# Patient Record
Sex: FEMALE | Race: BLACK OR AFRICAN AMERICAN | NOT HISPANIC OR LATINO
[De-identification: names, ages, dates, MRNs, and addresses within clinical notes are randomized per-mention and may not be internally consistent; named-entity substitution may affect disease eponyms.]

---

## 2019-09-04 ENCOUNTER — APPOINTMENT (OUTPATIENT)
Dept: OBGYN | Facility: CLINIC | Age: 64
End: 2019-09-04
Payer: COMMERCIAL

## 2019-09-04 VITALS
SYSTOLIC BLOOD PRESSURE: 140 MMHG | WEIGHT: 125 LBS | DIASTOLIC BLOOD PRESSURE: 90 MMHG | HEIGHT: 63 IN | BODY MASS INDEX: 22.15 KG/M2

## 2019-09-04 PROCEDURE — 82270 OCCULT BLOOD FECES: CPT

## 2019-09-04 PROCEDURE — 99396 PREV VISIT EST AGE 40-64: CPT

## 2019-09-04 NOTE — PHYSICAL EXAM
[Awake] : awake [Alert] : alert [Acute Distress] : no acute distress [Mass] : no breast mass [Nipple Discharge] : no nipple discharge [Axillary LAD] : no axillary lymphadenopathy [Soft] : soft [Distended] : not distended [Tender] : non tender [H/Smegaly] : no hepatosplenomegaly [Oriented x3] : oriented to person, place, and time [Labia Majora] : labia major [Labia Minora] : labia minora [Normal] : clitoris [No Bleeding] : there was no active vaginal bleeding [Discharge] : had no discharge [Pap Obtained] : a Pap smear was performed [Dilated] : the cervix was not dilated [Motion Tenderness] : there was no cervical motion tenderness [Retroversion] : retroverted [Tenderness] : nontender [Enlarged ___ wks] : not enlarged [Uterine Adnexae] : were not tender and not enlarged [Mass ___ cm] : no uterine mass was palpated [Adnexa Tenderness] : were not tender [Ovarian Mass (___ Cm)] : there were no adnexal masses [Occult Blood] : occult blood test from digital rectal exam was negative [No Tenderness] : no rectal tenderness [Nl Sphincter Tone] : normal sphincter tone [Internal Hemorrhoid] : no internal hemorrhoids [External Hemorrhoid] : no external hemorrhoids [FreeTextEntry5] : STENOTIC EXTERNAL OS

## 2019-09-04 NOTE — HISTORY OF PRESENT ILLNESS
[1 Year Ago] : 1 year ago [Last Colonoscopy ___] : Last colonoscopy [unfilled] [Last Mammogram ___] : Last Mammogram was [unfilled] [Last Pap ___] : Last cervical pap smear was [unfilled] [Postmenopausal] : is postmenopausal [Pregnancy History] : pregnancy history: [Total Preg ___] : [unfilled] [Post-Menopause, No Sxs] : post-menopausal, currently without symptoms [Sexually Active] : is not sexually active

## 2019-09-09 LAB — CYTOLOGY CVX/VAG DOC THIN PREP: ABNORMAL

## 2020-02-07 ENCOUNTER — APPOINTMENT (OUTPATIENT)
Dept: OBGYN | Facility: CLINIC | Age: 65
End: 2020-02-07
Payer: COMMERCIAL

## 2020-02-07 VITALS
DIASTOLIC BLOOD PRESSURE: 82 MMHG | HEIGHT: 63 IN | SYSTOLIC BLOOD PRESSURE: 140 MMHG | BODY MASS INDEX: 22.15 KG/M2 | WEIGHT: 125 LBS

## 2020-02-07 PROCEDURE — 99213 OFFICE O/P EST LOW 20 MIN: CPT

## 2020-02-07 NOTE — CHIEF COMPLAINT
[Follow Up] : follow up GYN visit [FreeTextEntry1] : PATIENT PRESENT FOR GYN FOLLOW UP. PATIENT WILL LIKE TO DISCUSS CT SCAN TEST RESULTS.

## 2020-03-03 ENCOUNTER — OUTPATIENT (OUTPATIENT)
Dept: OUTPATIENT SERVICES | Facility: HOSPITAL | Age: 65
LOS: 1 days | End: 2020-03-03
Payer: COMMERCIAL

## 2020-03-03 DIAGNOSIS — R00.2 PALPITATIONS: ICD-10-CM

## 2020-03-05 PROCEDURE — 93225 XTRNL ECG REC<48 HRS REC: CPT

## 2021-01-29 ENCOUNTER — APPOINTMENT (OUTPATIENT)
Dept: OBGYN | Facility: CLINIC | Age: 66
End: 2021-01-29
Payer: COMMERCIAL

## 2021-01-29 VITALS
DIASTOLIC BLOOD PRESSURE: 80 MMHG | BODY MASS INDEX: 22.53 KG/M2 | WEIGHT: 127.13 LBS | SYSTOLIC BLOOD PRESSURE: 140 MMHG | HEIGHT: 63 IN

## 2021-01-29 DIAGNOSIS — Z00.00 ENCOUNTER FOR GENERAL ADULT MEDICAL EXAMINATION W/OUT ABNORMAL FINDINGS: ICD-10-CM

## 2021-01-29 PROCEDURE — 99396 PREV VISIT EST AGE 40-64: CPT

## 2021-01-29 PROCEDURE — 99072 ADDL SUPL MATRL&STAF TM PHE: CPT

## 2021-01-29 NOTE — PHYSICAL EXAM
[Appropriately responsive] : appropriately responsive [Alert] : alert [No Acute Distress] : no acute distress [No Lymphadenopathy] : no lymphadenopathy [Regular Rate Rhythm] : regular rate rhythm [No Murmurs] : no murmurs [Clear to Auscultation B/L] : clear to auscultation bilaterally [Soft] : soft [Non-tender] : non-tender [Non-distended] : non-distended [No HSM] : No HSM [No Lesions] : no lesions [No Mass] : no mass [Oriented x3] : oriented x3 [Examination Of The Breasts] : a normal appearance [No Masses] : no breast masses were palpable [Labia Majora] : normal [Labia Minora] : normal [Normal] : normal [Retroversion] : retroverted [Uterine Adnexae] : normal [No Tenderness] : no tenderness [Nl Sphincter Tone] : normal sphincter tone [Tenderness] : nontender [Enlarged ___ wks] : not enlarged [FreeTextEntry9] : GUAIAC NEGATIVE

## 2021-01-29 NOTE — HISTORY OF PRESENT ILLNESS
[Currently In Menopause] : currently in menopause [Post-Menopause, No Sxs] : post-menopausal, currently without symptoms [Previously active] : previously active [No] : No [FreeTextEntry1] : AZAEL HENRY IS A 65 year OLD WHO PRESENTS FOR AN ANNUAL GYN EXAM.  SHE IS WITHOUT COMPLAINTS.  MS. HENRY REPORTS A RECENT NORMAL MMG.\par

## 2021-02-09 LAB — CYTOLOGY CVX/VAG DOC THIN PREP: ABNORMAL

## 2021-03-05 ENCOUNTER — OUTPATIENT (OUTPATIENT)
Dept: OUTPATIENT SERVICES | Facility: HOSPITAL | Age: 66
LOS: 1 days | End: 2021-03-05
Payer: SELF-PAY

## 2021-03-05 ENCOUNTER — APPOINTMENT (OUTPATIENT)
Dept: CT IMAGING | Facility: HOSPITAL | Age: 66
End: 2021-03-05

## 2021-03-05 PROCEDURE — 75571 CT HRT W/O DYE W/CA TEST: CPT | Mod: 26

## 2021-03-05 PROCEDURE — 75571 CT HRT W/O DYE W/CA TEST: CPT

## 2021-03-17 ENCOUNTER — TRANSCRIPTION ENCOUNTER (OUTPATIENT)
Age: 66
End: 2021-03-17

## 2021-09-23 ENCOUNTER — NON-APPOINTMENT (OUTPATIENT)
Age: 66
End: 2021-09-23

## 2021-09-23 ENCOUNTER — LABORATORY RESULT (OUTPATIENT)
Age: 66
End: 2021-09-23

## 2021-09-23 ENCOUNTER — APPOINTMENT (OUTPATIENT)
Dept: PULMONOLOGY | Facility: CLINIC | Age: 66
End: 2021-09-23
Payer: COMMERCIAL

## 2021-09-23 VITALS
SYSTOLIC BLOOD PRESSURE: 114 MMHG | OXYGEN SATURATION: 98 % | WEIGHT: 125 LBS | HEART RATE: 77 BPM | DIASTOLIC BLOOD PRESSURE: 84 MMHG | BODY MASS INDEX: 22.15 KG/M2 | TEMPERATURE: 98.1 F | HEIGHT: 63 IN

## 2021-09-23 PROCEDURE — 99204 OFFICE O/P NEW MOD 45 MIN: CPT

## 2021-09-23 NOTE — PHYSICAL EXAM
[No Acute Distress] : no acute distress [Normal Rate/Rhythm] : normal rate/rhythm [Normal S1, S2] : normal s1, s2 [No Murmurs] : no murmurs [No Resp Distress] : no resp distress [Clear to Auscultation Bilaterally] : clear to auscultation bilaterally [No Clubbing] : no clubbing [No Edema] : no edema [No Rash] : no rash

## 2021-09-27 ENCOUNTER — APPOINTMENT (OUTPATIENT)
Dept: PULMONOLOGY | Facility: CLINIC | Age: 66
End: 2021-09-27
Payer: COMMERCIAL

## 2021-09-27 PROCEDURE — 94070 EVALUATION OF WHEEZING: CPT

## 2021-09-27 PROCEDURE — 95070 INHLJ BRNCL CHALLENGE TSTG: CPT

## 2021-09-27 NOTE — HISTORY OF PRESENT ILLNESS
[TextBox_4] : 09/23/2021: Asked to evaluate patient by Dr Deidre Hurtado for chronic cough x 2 years, really more like 5 years at least, worse in summer, but still present now. Worse at night, on bending over, on drinking cold water, if the air is cold or humid. Has tested negative for Covid. Has gone to [facility] several times, last time 8/2021. She has tried using Nasonex, which seems to help, but she only uses it sporadically, not daily. Nasal saline spray has not helped. She's been on benzonatate for the past week and the cough is still there. No history of asthma. Does have ocular allergy symptoms and takes eye drops. Sometimes dyspneic. Endorses heartburn symptoms and takes no medications; has been referred to GI and is going to have endoscopy after cardiac evaluation. Never smoker. Works in retail. Covid tested on Tuesday and had vax.\par

## 2021-09-27 NOTE — CONSULT LETTER
[Dear  ___] : Dear  [unfilled], [( Thank you for referring [unfilled] for consultation for _____ )] : Thank you for referring [unfilled] for consultation for [unfilled] [Please see my note below.] : Please see my note below. [Consult Closing:] : Thank you very much for allowing me to participate in the care of this patient.  If you have any questions, please do not hesitate to contact me. [Sincerely,] : Sincerely, [FreeTextEntry2] : Deidre Hurtado MD\par [address]\[place] [address]\par  [FreeTextEntry3] : Shereen Kate MD, FCCP\par

## 2021-09-27 NOTE — ASSESSMENT
[FreeTextEntry1] : Data reviewed:\par \par PA/lat CXR [facility] 9/2020 and also 2019 and also 2016 all personally reviewed : mild scoliosis, and clear lungs\par \par Impression:\par Chronic cough\par Allergic/PND\par GERD\par R/o asthma\par \par Plan:\par She's had 3 CXRs all with clear lungs so I did not repeat today. It sounds like her cough is multifactorial. I will not address the GERD since she is about to have an endoscopy, but GERD treatment may be necessary to treat the cough. She may also benefit from seeing allergy. For my part, to include or exclude asthma as a contributing condition, I will bring her back for PFT vs methacholine challenge, depending on initial spirometry, once she has a Covid PCR.\par --\par MCT 9/27/21: negative for bronchial hyperreactivity.\par Discussed w pt that this effectively excludes asthma as cause of cough. Would proceed with endoscopy and reflux treatment. If that doesn't resolve the cough, or in parallel, could see allergy/ENT. If treatment for these common causes of cough does not improve the cough then she should return to see me.

## 2021-12-17 ENCOUNTER — OUTPATIENT (OUTPATIENT)
Dept: OUTPATIENT SERVICES | Facility: HOSPITAL | Age: 66
LOS: 1 days | End: 2021-12-17
Payer: COMMERCIAL

## 2021-12-17 DIAGNOSIS — R06.02 SHORTNESS OF BREATH: ICD-10-CM

## 2021-12-17 DIAGNOSIS — R07.9 CHEST PAIN, UNSPECIFIED: ICD-10-CM

## 2021-12-17 PROCEDURE — 93017 CV STRESS TEST TRACING ONLY: CPT

## 2022-05-04 ENCOUNTER — NON-APPOINTMENT (OUTPATIENT)
Age: 67
End: 2022-05-04

## 2022-05-04 ENCOUNTER — APPOINTMENT (OUTPATIENT)
Dept: OBGYN | Facility: CLINIC | Age: 67
End: 2022-05-04
Payer: COMMERCIAL

## 2022-05-04 VITALS
DIASTOLIC BLOOD PRESSURE: 80 MMHG | HEIGHT: 63 IN | BODY MASS INDEX: 21.3 KG/M2 | WEIGHT: 120.25 LBS | SYSTOLIC BLOOD PRESSURE: 118 MMHG

## 2022-05-04 DIAGNOSIS — N88.2 STRICTURE AND STENOSIS OF CERVIX UTERI: ICD-10-CM

## 2022-05-04 PROCEDURE — 99396 PREV VISIT EST AGE 40-64: CPT

## 2022-05-04 NOTE — PLAN
[FreeTextEntry1] : We discussed that since she is a low risk gyn patient, this can be her last Pap smear.  She verbalizes understanding

## 2022-05-04 NOTE — HISTORY OF PRESENT ILLNESS
[FreeTextEntry1] : AZAEL HENRY IS A 66 year OLD WHO PRESENTS FOR AN ANNUAL GYN EXAM.  SHE IS WITHOUT COMPLAINTS.\par

## 2022-05-04 NOTE — PHYSICAL EXAM
[Chaperone Present] : A chaperone was present in the examining room during all aspects of the physical examination [Appropriately responsive] : appropriately responsive [Alert] : alert [No Acute Distress] : no acute distress [No Lymphadenopathy] : no lymphadenopathy [Regular Rate Rhythm] : regular rate rhythm [No Murmurs] : no murmurs [Clear to Auscultation B/L] : clear to auscultation bilaterally [Soft] : soft [Non-tender] : non-tender [Non-distended] : non-distended [No HSM] : No HSM [No Lesions] : no lesions [No Mass] : no mass [Oriented x3] : oriented x3 [Examination Of The Breasts] : a normal appearance [Breast Palpation Diffuse Fibrous Tissue Bilateral] : fibrocystic changes [No Masses] : no breast masses were palpable [Labia Majora] : normal [Labia Minora] : normal [Atrophy] : atrophy [Dry Mucosa] : dry mucosa [Normal] : normal [Normal Position] : in a normal position [Uterine Adnexae] : normal [Tenderness] : nontender [Enlarged ___ wks] : not enlarged

## 2022-05-09 ENCOUNTER — NON-APPOINTMENT (OUTPATIENT)
Age: 67
End: 2022-05-09

## 2022-05-09 LAB — CYTOLOGY CVX/VAG DOC THIN PREP: ABNORMAL

## 2023-09-01 ENCOUNTER — APPOINTMENT (OUTPATIENT)
Dept: OBGYN | Facility: CLINIC | Age: 68
End: 2023-09-01
Payer: COMMERCIAL

## 2023-09-01 VITALS
BODY MASS INDEX: 22.5 KG/M2 | HEART RATE: 87 BPM | OXYGEN SATURATION: 99 % | SYSTOLIC BLOOD PRESSURE: 138 MMHG | WEIGHT: 127 LBS | DIASTOLIC BLOOD PRESSURE: 84 MMHG

## 2023-09-01 DIAGNOSIS — Z01.419 ENCOUNTER FOR GYNECOLOGICAL EXAMINATION (GENERAL) (ROUTINE) W/OUT ABNORMAL FINDINGS: ICD-10-CM

## 2023-09-01 DIAGNOSIS — R10.31 RIGHT LOWER QUADRANT PAIN: ICD-10-CM

## 2023-09-01 PROCEDURE — 99397 PER PM REEVAL EST PAT 65+ YR: CPT | Mod: 25

## 2023-09-01 NOTE — HISTORY OF PRESENT ILLNESS
[Patient reported mammogram was normal] : Patient reported mammogram was normal [Patient reported breast sonogram was normal] : Patient reported breast sonogram was normal [Patient reported PAP Smear was normal] : Patient reported PAP Smear was normal [Post-Menopause, No Sxs] : post-menopausal, currently without symptoms [No] : Patient does not have concerns regarding sex [Previously active] : previously active [FreeTextEntry1] : Nadia Palacios 68 yo female presents for annual GYN exams.  Pt complaints for right abdomen/pelvic pain.  Ms. Rodríguez reports that she continues to have a bulge in the RLQ that has gotten larger.  She is s/p an US of the pelvis that didn;'t find any clinical reason for the bulge.   [Mammogramdate] : 10/2022 [TextBox_19] : as per pt [BreastSonogramDate] : 10/2022 [TextBox_25] : as per pt [PapSmeardate] : 05/2022

## 2023-09-01 NOTE — PHYSICAL EXAM
[Chaperone Present] : A chaperone was present in the examining room during all aspects of the physical examination [Appropriately responsive] : appropriately responsive [Alert] : alert [No Acute Distress] : no acute distress [Soft] : soft [Non-tender] : non-tender [Non-distended] : non-distended [No HSM] : No HSM [No Lesions] : no lesions [No Mass] : no mass [Oriented x3] : oriented x3 [Examination Of The Breasts] : a normal appearance [Breast Palpation Diffuse Fibrous Tissue Bilateral] : fibrocystic changes [No Masses] : no breast masses were palpable [Labia Majora] : normal [Labia Minora] : normal [Atrophy] : atrophy [Normal] : normal [Uterine Adnexae] : normal [FreeTextEntry7] : No mass visualized or palpated with or without valsalva

## 2023-09-07 ENCOUNTER — NON-APPOINTMENT (OUTPATIENT)
Age: 68
End: 2023-09-07

## 2023-09-07 LAB — CYTOLOGY CVX/VAG DOC THIN PREP: ABNORMAL

## 2023-09-16 ENCOUNTER — APPOINTMENT (OUTPATIENT)
Dept: CT IMAGING | Facility: HOSPITAL | Age: 68
End: 2023-09-16

## 2023-10-05 ENCOUNTER — APPOINTMENT (OUTPATIENT)
Dept: PULMONOLOGY | Facility: CLINIC | Age: 68
End: 2023-10-05

## 2023-12-07 ENCOUNTER — APPOINTMENT (OUTPATIENT)
Dept: PULMONOLOGY | Facility: CLINIC | Age: 68
End: 2023-12-07
Payer: COMMERCIAL

## 2023-12-07 VITALS
SYSTOLIC BLOOD PRESSURE: 126 MMHG | HEART RATE: 77 BPM | TEMPERATURE: 97.8 F | DIASTOLIC BLOOD PRESSURE: 90 MMHG | WEIGHT: 126 LBS | HEIGHT: 63 IN | BODY MASS INDEX: 22.32 KG/M2 | OXYGEN SATURATION: 95 %

## 2023-12-07 DIAGNOSIS — Z23 ENCOUNTER FOR IMMUNIZATION: ICD-10-CM

## 2023-12-07 DIAGNOSIS — R05.3 CHRONIC COUGH: ICD-10-CM

## 2023-12-07 PROCEDURE — G0009: CPT

## 2023-12-07 PROCEDURE — 90677 PCV20 VACCINE IM: CPT

## 2023-12-07 PROCEDURE — 99213 OFFICE O/P EST LOW 20 MIN: CPT | Mod: 25

## 2023-12-07 RX ORDER — FAMOTIDINE 20 MG/1
20 TABLET, FILM COATED ORAL
Qty: 90 | Refills: 0 | Status: ACTIVE | COMMUNITY
Start: 2023-12-07 | End: 1900-01-01

## 2024-09-11 ENCOUNTER — NON-APPOINTMENT (OUTPATIENT)
Age: 69
End: 2024-09-11

## 2024-09-12 ENCOUNTER — APPOINTMENT (OUTPATIENT)
Dept: OBGYN | Facility: CLINIC | Age: 69
End: 2024-09-12

## 2024-09-12 VITALS
SYSTOLIC BLOOD PRESSURE: 144 MMHG | WEIGHT: 127 LBS | DIASTOLIC BLOOD PRESSURE: 88 MMHG | BODY MASS INDEX: 22.5 KG/M2 | HEART RATE: 73 BPM | OXYGEN SATURATION: 100 %

## 2024-09-12 DIAGNOSIS — Z01.419 ENCOUNTER FOR GYNECOLOGICAL EXAMINATION (GENERAL) (ROUTINE) W/OUT ABNORMAL FINDINGS: ICD-10-CM

## 2024-09-12 PROCEDURE — 99397 PER PM REEVAL EST PAT 65+ YR: CPT

## 2024-09-12 PROCEDURE — 99459 PELVIC EXAMINATION: CPT | Mod: NC

## 2024-09-12 NOTE — HISTORY OF PRESENT ILLNESS
[postmenopausal] : postmenopausal [N] : Patient does not use contraception [Currently In Menopause] : currently in menopause [Post-Menopause, No Sxs] : post-menopausal, currently without symptoms [FreeTextEntry1] : Nadia Palacios a 67yo female presents for an annual exam.  [Y] : Patient is sexually active [Mammogramdate] : 10/2023 [BreastSonogramDate] : 10/2023 [PapSmeardate] : 9/1/2023 [BoneDensityDate] : 2023 [ColonoscopyDate] : 2016 [Previously active] : previously active [Men] : men [No] : No [Patient refuses STI testing] : Patient refuses STI testing

## 2024-09-17 ENCOUNTER — NON-APPOINTMENT (OUTPATIENT)
Age: 69
End: 2024-09-17

## 2024-09-17 ENCOUNTER — TRANSCRIPTION ENCOUNTER (OUTPATIENT)
Age: 69
End: 2024-09-17

## 2024-09-17 LAB — CYTOLOGY CVX/VAG DOC THIN PREP: ABNORMAL

## 2025-04-14 ENCOUNTER — NON-APPOINTMENT (OUTPATIENT)
Age: 70
End: 2025-04-14

## 2025-04-18 ENCOUNTER — APPOINTMENT (OUTPATIENT)
Dept: PULMONOLOGY | Facility: CLINIC | Age: 70
End: 2025-04-18
Payer: COMMERCIAL

## 2025-04-18 VITALS
WEIGHT: 118 LBS | SYSTOLIC BLOOD PRESSURE: 130 MMHG | HEIGHT: 63 IN | TEMPERATURE: 98.1 F | DIASTOLIC BLOOD PRESSURE: 88 MMHG | OXYGEN SATURATION: 96 % | BODY MASS INDEX: 20.91 KG/M2 | HEART RATE: 86 BPM

## 2025-04-18 DIAGNOSIS — R05.3 CHRONIC COUGH: ICD-10-CM

## 2025-04-18 PROCEDURE — 95012 NITRIC OXIDE EXP GAS DETER: CPT

## 2025-04-18 PROCEDURE — 99213 OFFICE O/P EST LOW 20 MIN: CPT | Mod: 25

## 2025-04-18 PROCEDURE — 94010 BREATHING CAPACITY TEST: CPT

## 2025-04-18 RX ORDER — MOMETASONE 50 UG/1
50 SPRAY, METERED NASAL DAILY
Qty: 1 | Refills: 2 | Status: ACTIVE | COMMUNITY
Start: 2025-04-18 | End: 1900-01-01

## 2025-04-24 ENCOUNTER — APPOINTMENT (OUTPATIENT)
Dept: RADIOLOGY | Facility: CLINIC | Age: 70
End: 2025-04-24
Payer: COMMERCIAL

## 2025-04-24 PROCEDURE — 71046 X-RAY EXAM CHEST 2 VIEWS: CPT

## 2025-07-24 ENCOUNTER — APPOINTMENT (OUTPATIENT)
Dept: PULMONOLOGY | Facility: CLINIC | Age: 70
End: 2025-07-24